# Patient Record
Sex: MALE | Race: WHITE
[De-identification: names, ages, dates, MRNs, and addresses within clinical notes are randomized per-mention and may not be internally consistent; named-entity substitution may affect disease eponyms.]

---

## 2018-04-21 ENCOUNTER — HOSPITAL ENCOUNTER (EMERGENCY)
Dept: HOSPITAL 89 - ER | Age: 28
Discharge: HOME | End: 2018-04-21
Payer: COMMERCIAL

## 2018-04-21 VITALS — DIASTOLIC BLOOD PRESSURE: 83 MMHG | SYSTOLIC BLOOD PRESSURE: 124 MMHG

## 2018-04-21 DIAGNOSIS — S46.911A: Primary | ICD-10-CM

## 2018-04-21 PROCEDURE — 99282 EMERGENCY DEPT VISIT SF MDM: CPT

## 2018-04-21 NOTE — RADIOLOGY IMAGING REPORT
FACILITY: [facility] 

 

PATIENT NAME: Jabari Montano

: 1990

MR: [id number]

V: [id number]

EXAM DATE: 

ORDERING PHYSICIAN: JOEL KAMINSKI

TECHNOLOGIST: 

 

Location: [facility]

Patient: Jabari Montano

: 1990

MRN: [id number]

Visit/Account:[id number]

Date of Sevice:  2018

 

ACCESSION #: [id number]

 

SHOULDER MIN 2 VIEWS RIGHT

 

Indication: Right shoulder pain.

 

Comparison: None Available

 

Findings:

 

2 views of the right shoulder.

 

No evidence of acute fracture, dislocation, or radiopaque foreign body.

 

Normal mineralization, joint spaces, and alignment.

 

The included portion of the chest is clear.

 

IMPRESSION: Negative right shoulder radiographs.

 

Report Dictated By: Joel Harp MD at 2018 3:16 PM

 

Report E-Signed By: Joel Harp MD  at 2018 3:18 PM

 

WSN:[id number]

## 2018-04-21 NOTE — ER REPORT
History and Physical


Time Seen By MD:  14:41


HPI/ROS


CHIEF COMPLAINT: Shoulder injury





HISTORY OF PRESENT ILLNESS: This is a 27-year-old male presents to the 

emergency department for right shoulder pain. Patient states that about 6:30 

this morning while he was doing PT in [employer] he had his arm extended 

out in front of him and ran into a door jam, causing some discomfort to the 

right shoulder. Patient states that throughout the day he's noticed the right 

shoulder pain has increased and his range of motion has decreased. CMS intact. 

No obvious deformities, patient is using his arm during the exam. No other 

injuries, no aches, chills, nausea, vomiting, diarrhea.





REVIEW OF SYSTEMS:


Respiratory: No cough, no dyspnea.


Cardiovascular: No chest pain, no palpitations.


Gastrointestinal: No vomiting, no abdominal pain.


Musculoskeletal: As above.


Allergies:  


Coded Allergies:  


     No Known Drug Allergies (Unverified , 18)


Home Meds


Reported Medications


Trazodone Hcl (TRAZODONE HCL) 150 Mg Tablet, 150 MG PO QHS


   18


Venlafaxine Hcl (EFFEXOR XR) 150 Mg Cap.er.24h, 150 MG PO QDAY


   18


Discontinued Scripts


Ibuprofen (IBUPROFEN) 800 Mg Tablet, 1 TAB PO Q8H, #30


   Prov:ROBERT SHAH DO         6/20/15


Oxycodone Hcl/Acetaminophen (PERCOCET 5-325 MG TABLET) 1 Each Tablet, 1 EACH PO 

Q6-8H, #20


   Prov:ROBERT SHAH DO         6/20/15


Past Medical/Surgical History


Patient has no significant past medical or surgical history.


Hx Smoking:  No


Constitutional





Vital Sign - Last 24 Hours








 18





 14:38 14:40 15:00 15:04


 


Temp 99.4   


 


Pulse 85   80


 


Resp 14   


 


B/P (MAP) 133/89 133/89 (104) 124/83 (97) 


 


Pulse Ox 93   92


 


O2 Delivery Room Air   








Physical Exam


  General Appearance: The patient is alert, has no immediate need for airway 

protection and no current signs of toxicity.  


Eyes: Pupils equal and round no injection.


Respiratory: Chest is non tender, lungs are clear to auscultation.


Cardiac: regular rate and rhythm.


Gastrointestinal: Abdomen is soft and non tender, no masses, bowel sounds 

normal.


Musculoskeletal:  Neck: Neck is supple and non tender.


   Extremities pain over the right AC joint. No obvious deformity. No 

contusions or crepitus. + Britt. Negative scarf test, negative back scratch. 


Skin: No rashes or lesions.





DIFFERENTIAL DIAGNOSIS: After history and physical exam differential diagnosis 

was considered for contusion, shoulder dislocation, before meals separation, 

rotator cuff and humeral head fracture.





Medical Decision Making


EKG/Imaging


Imaging


Location: [facility]


Patient: Jabari Montano


: 1990


MRN: [id number]


Visit/Account:[id number]


Date of Sevice:  2018


 


ACCESSION #: [id number]


 


SHOULDER MIN 2 VIEWS RIGHT


 


Indication: Right shoulder pain.


 


Comparison: None Available


 


Findings:


 


2 views of the right shoulder.


 


No evidence of acute fracture, dislocation, or radiopaque foreign body.


 


Normal mineralization, joint spaces, and alignment.


 


The included portion of the chest is clear.


 


IMPRESSION: Negative right shoulder radiographs.


 


Report Dictated By: Joel Harp MD at 2018 3:16 PM


 


Report E-Signed By: Joel Harp MD  at 2018 3:18 PM


 


WSN:[id number]





ED Course/Re-evaluation


ED Course


The patient was admitted to room. A history of physical were obtained. 

Differential diagnoses were considered. X-ray of the right shoulder was 

negative for any acute abnormalities. I did review these results with the 

patient and his commander in the room. I did tell patient that he can take 

Avapro and Tylenol as needed for his pain. A two-view exercises for the next 24-

48 hours no lifting more than 10 pounds for 48 hours then resume normal 

activity as tolerated. Patient was in agreement with his private care. Patient 

was discharged home.


Decision to Disposition Date:  2018


Decision to Disposition Time:  15:33





Depart


Departure


Latest Vital Signs





Vital Signs








  Date Time  Temp Pulse Resp B/P (MAP) Pulse Ox O2 Delivery O2 Flow Rate FiO2


 


18 15:04  80   92   


 


18 15:00    124/83 (97)    


 


18 14:38 99.4  14   Room Air  








Impression:  


 Primary Impression:  


 Right shoulder strain


Condition:  Improved


Disposition:  HOME OR SELF-CARE


Referrals:  


[facility]


Patient Instructions:  Exercises for Shoulder Abduction and Adduction (ED), 

Shoulder Pain (ED)





Additional Instructions:  


Drink plenty of fluids.


Get plenty of rest.


Be sure to perform range of motion exercises every 1-2 hours while awake.


Do not lift more than 5-10 pounds for the next 2 days.


Follow-up with your primary care provider as needed.


Return to the emergency department for any other concerns or worsening symptoms.


Take ibuprofen or Tylenol as needed for the pain.





Problem Qualifiers








 Primary Impression:  


 Right shoulder strain


 Encounter type:  initial encounter  Qualified Codes:  S46.911A - Strain of 

unspecified muscle, fascia and tendon at shoulder and upper arm level, right arm

, initial encounter








JOEL KAMINSKI FNP-BC 2018 14:41